# Patient Record
Sex: FEMALE | Race: WHITE | URBAN - METROPOLITAN AREA
[De-identification: names, ages, dates, MRNs, and addresses within clinical notes are randomized per-mention and may not be internally consistent; named-entity substitution may affect disease eponyms.]

---

## 2022-02-09 ENCOUNTER — OFFICE VISIT (OUTPATIENT)
Dept: URBAN - METROPOLITAN AREA CLINIC 72 | Facility: CLINIC | Age: 57
End: 2022-02-09
Payer: COMMERCIAL

## 2022-02-09 DIAGNOSIS — H18.233 SECONDARY CORNEAL EDEMA, BILATERAL: ICD-10-CM

## 2022-02-09 DIAGNOSIS — H40.053 OCULAR HYPERTENSION, BILATERAL: Primary | ICD-10-CM

## 2022-02-09 PROCEDURE — 92133 CPTRZD OPH DX IMG PST SGM ON: CPT

## 2022-02-09 PROCEDURE — 92004 COMPRE OPH EXAM NEW PT 1/>: CPT

## 2022-02-09 RX ORDER — DORZOLAMIDE HYDROCHLORIDE AND TIMOLOL MALEATE 20; 5 MG/ML; MG/ML
SOLUTION/ DROPS OPHTHALMIC
Qty: 1 | Refills: 4 | Status: INACTIVE
Start: 2022-02-09 | End: 2022-02-10

## 2022-02-09 RX ORDER — BRIMONIDINE TARTRATE 2 MG/ML
0.2 % SOLUTION/ DROPS OPHTHALMIC
Qty: 1 | Refills: 4 | Status: INACTIVE
Start: 2022-02-09 | End: 2022-02-10

## 2022-02-09 ASSESSMENT — INTRAOCULAR PRESSURE
OS: 80
OD: 72
OS: 72
OD: 80

## 2022-02-09 NOTE — IMPRESSION/PLAN
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 Plan: Discussed diagnosis in detail with patient. Edema should resolve with treating IOP Will continue to observe condition and or symptoms.

## 2022-02-09 NOTE — IMPRESSION/PLAN
Impression: Ocular hypertension, bilateral: H40.053. Patient reports ANGELA was 3 years ago and Dr mentioned she had high pressures but never treated. IOP 72 OU with [facility] Attempted to do [facility] Maxed out over 80 OU, sonya not aligned Plan: Discussed DX in detail with pt Pt voiced understanding Discussed treatment options. Recommend pt to start Acetazolamide 2 pills PO BID and start gtts Brimonidine BID OU D/T BID OU and Rhopressa QHS OU Rhopressa and Acetazolamide samples given in clinic today

## 2022-02-10 ENCOUNTER — OFFICE VISIT (OUTPATIENT)
Dept: URBAN - METROPOLITAN AREA CLINIC 71 | Facility: CLINIC | Age: 57
End: 2022-02-10
Payer: COMMERCIAL

## 2022-02-10 PROCEDURE — 99212 OFFICE O/P EST SF 10 MIN: CPT

## 2022-02-10 RX ORDER — BRIMONIDINE TARTRATE 2 MG/ML
0.2 % SOLUTION/ DROPS OPHTHALMIC
Qty: 5 | Refills: 4 | Status: ACTIVE
Start: 2022-02-10

## 2022-02-10 ASSESSMENT — INTRAOCULAR PRESSURE
OS: 15
OD: 29
OS: 17
OD: 28

## 2022-02-10 NOTE — IMPRESSION/PLAN
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 Neovascular glaucoma OU Gonioscopy performed today: No angle structures seen except inferior quadrant OD; No angle structures seen 360 OS
(+) rubeosis OU Current treatment:

500 mg Diamox BID PO through Saturday, taper to 250 mg BID PO starting Sunday 1 drop Rhopressa QHS OU
1 drop Dorzalmide TID OU
1 drop Brimonidine BID OU Pressures today of 29/17 mm Hg via [facility]
28/15 mm Hg via [facility] Pressured yesterday of 72/72 mm Hg via [facility]
80+/80+ via [facility]-maxed out on tonometer and mires were not aligned Offered iridotomy today, patient declined. Will proceed w/ pre-op on Monday and iridotomy to be performed Tuesday w/ Dr. Alex Payan. Plan: Patient and  educated on today's findings. Expressed multiple times throughout appointment that while IOP has improved today, long term, this treatment regimen may not be sustainable. Diamox is not a good long-term option for treating IOP, especially past the 3 month period as severe complications can develop. Explained to patient that iridotomy may not help bring IOP down, as angles may be irreversible at this point but attempting to create another route of fluid drainage should help with potential future complications. Educated patient and  on importance of seeking consult with glaucoma specialist, Dr. Tarah Breen, as patient will most likely need shunt/tube procedure to keep IOP at desired and stable level. Pending appt at this time and will be scheduled w/ Dr. Tarah Breen ASAP in [place]. Expressed multiple times to patient and  that glaucomatous damage, is non-reversible. Vision lost will not be regained. If OS does not get proper surgical treatment, can go blind as OD did. Patient has much anxiety about any eye procedures and requests 1 mg Lorazepam before any procedure. To follow up w/ PCP, RM Galarza and request dosage for procedure on Tuesday.

## 2022-02-14 ENCOUNTER — OFFICE VISIT (OUTPATIENT)
Dept: URBAN - METROPOLITAN AREA CLINIC 71 | Facility: CLINIC | Age: 57
End: 2022-02-14
Payer: COMMERCIAL

## 2022-02-14 DIAGNOSIS — H25.813 COMBINED FORMS OF AGE-RELATED CATARACT, BILATERAL: ICD-10-CM

## 2022-02-14 DIAGNOSIS — H40.89 OTHER SPECIFIED GLAUCOMA: Primary | ICD-10-CM

## 2022-02-14 PROCEDURE — 92012 INTRM OPH EXAM EST PATIENT: CPT | Performed by: OPHTHALMOLOGY

## 2022-02-14 ASSESSMENT — INTRAOCULAR PRESSURE
OD: 55
OD: 60
OS: 16
OS: 15

## 2022-02-14 NOTE — IMPRESSION/PLAN
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
(+) rubeosis OU Discussed with patient that for OD the goal is to keep the eye comfortable. Plan: Continue use of Diamox  mg PO, Rhopressa QHS OU, Dorzalmide TID OU and Brimonidine BID OU. Patient to return as scheduled for the LPI tomorrow for OS with Dr. Caass Peers.

## 2022-02-15 ENCOUNTER — SURGERY (OUTPATIENT)
Dept: URBAN - METROPOLITAN AREA SURGERY 44 | Facility: SURGERY | Age: 57
End: 2022-02-15
Payer: COMMERCIAL

## 2022-02-15 ENCOUNTER — SURGERY (OUTPATIENT)
Dept: URBAN - METROPOLITAN AREA SURGERY 45 | Facility: SURGERY | Age: 57
End: 2022-02-15
Payer: COMMERCIAL

## 2022-02-15 PROCEDURE — 66761 REVISION OF IRIS: CPT | Performed by: OPHTHALMOLOGY

## 2022-02-19 ENCOUNTER — OFFICE VISIT (OUTPATIENT)
Dept: URBAN - METROPOLITAN AREA CLINIC 71 | Facility: CLINIC | Age: 57
End: 2022-02-19
Payer: COMMERCIAL

## 2022-02-19 PROCEDURE — 99212 OFFICE O/P EST SF 10 MIN: CPT

## 2022-02-19 ASSESSMENT — INTRAOCULAR PRESSURE
OD: 48
OS: 11

## 2022-02-19 ASSESSMENT — KERATOMETRY
OD: 47.00
OS: 47.75

## 2022-02-19 NOTE — IMPRESSION/PLAN
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
(+) rubeosis OU
OU: Discussed diagnosis with the PT. IOP greatly improved S/P LPI OS at 11 today. PT is anxious on getting down to the [place] for Dr. Shirlene Pallas because of her bad back. Advised PT she really should see a glaucoma specialist and would like to get her in by the 1 month juan jose, as patient is well managed now but forewarned, again, about long term side effects of Diamox use. Could possibly get her scheduled with Dr. Efrain Coppola will look into Dr. Efrain Coppola taking new patients and if he is still performing glaucoma surgeries and let her know. For now PT is to continue taking Brimonidine BID OU, Dorzolamide TID OU and PRED TID OS, along w/ 250 mg Diamox BID PO. Recommend at next scheduled visit we get a VF 24-2 OS ONLY

## 2022-02-19 NOTE — IMPRESSION/PLAN
Impression: Combined forms of age-related cataract, bilateral: H25.813. Plan: Cataract are stable OU. Discussed due to monocular vision OS we will hold off on CAT SX as long as we can. No treatment currently recommended. The patient will monitor vision changes and contact us with any decrease in vision.

## 2022-03-09 ENCOUNTER — OFFICE VISIT (OUTPATIENT)
Dept: URBAN - METROPOLITAN AREA CLINIC 72 | Facility: CLINIC | Age: 57
End: 2022-03-09
Payer: COMMERCIAL

## 2022-03-09 DIAGNOSIS — H40.1133 PRIMARY OPEN-ANGLE GLAUCOMA, BILATERAL, SEVERE STAGE: Primary | ICD-10-CM

## 2022-03-09 PROCEDURE — 92012 INTRM OPH EXAM EST PATIENT: CPT

## 2022-03-09 PROCEDURE — 92083 EXTENDED VISUAL FIELD XM: CPT

## 2022-03-09 ASSESSMENT — INTRAOCULAR PRESSURE
OS: 18
OD: 55

## 2022-04-01 ENCOUNTER — TESTING ONLY (OUTPATIENT)
Dept: URBAN - METROPOLITAN AREA CLINIC 71 | Facility: CLINIC | Age: 57
End: 2022-04-01

## 2022-04-01 DIAGNOSIS — H52.4 PRESBYOPIA: Primary | ICD-10-CM

## 2022-04-01 ASSESSMENT — INTRAOCULAR PRESSURE
OD: 57
OS: 18

## 2022-04-01 ASSESSMENT — VISUAL ACUITY
OD: NLP
OS: 20/20

## 2022-04-01 NOTE — IMPRESSION/PLAN
Impression: Presbyopia: H52.4. BCVA OS only 20/20 OD NLP Plan: Dispensed new glasses Rx today and discussed changes and possible adaptation period. Patient to call if any issues with new glasses occur.

## 2022-05-02 ENCOUNTER — OFFICE VISIT (OUTPATIENT)
Dept: URBAN - METROPOLITAN AREA CLINIC 24 | Facility: CLINIC | Age: 57
End: 2022-05-02
Payer: COMMERCIAL

## 2022-05-02 DIAGNOSIS — H40.50X2 GLAUCOMA SECONDARY TO OTHER EYE DISORDER, MODERATE STAGE: Primary | ICD-10-CM

## 2022-05-02 DIAGNOSIS — H34.8311 TRIB RTNL VEIN OCCLUSION, RIGHT EYE, W RTNL NEOVAS: ICD-10-CM

## 2022-05-02 DIAGNOSIS — H25.13 AGE-RELATED NUCLEAR CATARACT, BILATERAL: ICD-10-CM

## 2022-05-02 DIAGNOSIS — H40.1133 PRIMARY OPEN-ANGLE GLAUCOMA, BILATERAL, SEVERE STAGE: ICD-10-CM

## 2022-05-02 PROCEDURE — 92133 CPTRZD OPH DX IMG PST SGM ON: CPT | Performed by: OPHTHALMOLOGY

## 2022-05-02 PROCEDURE — 92004 COMPRE OPH EXAM NEW PT 1/>: CPT | Performed by: OPHTHALMOLOGY

## 2022-05-02 PROCEDURE — 76514 ECHO EXAM OF EYE THICKNESS: CPT | Performed by: OPHTHALMOLOGY

## 2022-05-02 RX ORDER — PREDNISOLONE ACETATE 10 MG/ML
1 % SUSPENSION/ DROPS OPHTHALMIC
Qty: 5 | Refills: 1 | Status: ACTIVE
Start: 2022-05-02

## 2022-05-02 RX ORDER — OFLOXACIN 3 MG/ML
0.3 % SOLUTION/ DROPS OPHTHALMIC
Qty: 5 | Refills: 1 | Status: ACTIVE
Start: 2022-05-02

## 2022-05-02 ASSESSMENT — INTRAOCULAR PRESSURE
OS: 19
OD: 65

## 2022-05-02 NOTE — IMPRESSION/PLAN
Impression: Age-related nuclear cataract, bilateral: H25.13.  Plan: May need Cat MIGS OS if IOP elevates once diamox is removed

## 2022-05-02 NOTE — IMPRESSION/PLAN
Impression: Trib rtnl vein occlusion, right eye, w rtnl neovas: V34.2995. Plan: + vein occlusion OD on exam - NLP confirmed with Dr. Phi Frank Comfort care
+ HTN - discussed origin of vein occlusion and that vision loss is permanent

## 2022-05-19 ENCOUNTER — SURGERY (OUTPATIENT)
Dept: URBAN - METROPOLITAN AREA SURGERY 12 | Facility: SURGERY | Age: 57
End: 2022-05-19
Payer: COMMERCIAL

## 2022-05-19 PROCEDURE — 66710 CILIARY TRANSSLERAL THERAPY: CPT | Performed by: OPHTHALMOLOGY

## 2022-05-20 ENCOUNTER — POST-OPERATIVE VISIT (OUTPATIENT)
Dept: URBAN - METROPOLITAN AREA CLINIC 72 | Facility: CLINIC | Age: 57
End: 2022-05-20
Payer: COMMERCIAL

## 2022-05-20 DIAGNOSIS — Z48.810 ENCOUNTER FOR SURGICAL AFTERCARE FOLLOWING SURGERY ON A SENSE ORGAN: Primary | ICD-10-CM

## 2022-05-20 PROCEDURE — 99024 POSTOP FOLLOW-UP VISIT: CPT | Performed by: OPTOMETRIST

## 2022-05-20 ASSESSMENT — INTRAOCULAR PRESSURE
OS: 17
OD: 15

## 2022-05-20 NOTE — IMPRESSION/PLAN
Impression: S/P Diode G-Probe Laser OD - 1 Day. Encounter for surgical aftercare following surgery on a sense organ  Z48.810. Excellent post op course   Post operative instructions reviewed - Condition is improving - Plan: IOP has decreased OD, recommend pt to continue with Pred and ofloxacin QID OD and continue with Brimonidine BID OS Dorzolamide BID OS and Rhopressa QHS OS will reevaluate in 1 wk --Advised patient to use artificial tears for comfort.

## 2022-05-26 ENCOUNTER — POST-OPERATIVE VISIT (OUTPATIENT)
Dept: URBAN - METROPOLITAN AREA CLINIC 72 | Facility: CLINIC | Age: 57
End: 2022-05-26
Payer: COMMERCIAL

## 2022-05-26 PROCEDURE — 99024 POSTOP FOLLOW-UP VISIT: CPT | Performed by: OPTOMETRIST

## 2022-05-26 ASSESSMENT — INTRAOCULAR PRESSURE
OD: 19
OS: 19

## 2022-05-26 NOTE — IMPRESSION/PLAN
Impression: S/P Diode G-Probe Laser OD - 7 Days. Encounter for surgical aftercare following surgery on a sense organ  Z48.810. Excellent post op course   Post operative instructions reviewed - Condition is improving - Plan: Pt to continue with gtts in OS Brimonidine bid OS, Dorzolamide tid OS, Rhopressa qhs OS
recommend pt to keep scheduled appt with Dr Bee Mitchell 
 --Advised patient to use artificial tears for comfort. --Taper Prednisolone acetate 1% TID x 1 wk, BID x 1wk, QD x 1wk, then d/c--Discontinue Ofloxacin 0.3%

## 2022-06-15 ENCOUNTER — POST-OPERATIVE VISIT (OUTPATIENT)
Dept: URBAN - METROPOLITAN AREA CLINIC 24 | Facility: CLINIC | Age: 57
End: 2022-06-15
Payer: COMMERCIAL

## 2022-06-15 DIAGNOSIS — H40.50X2 GLAUCOMA SECONDARY TO OTHER EYE DISORDER, MODERATE STAGE: Primary | ICD-10-CM

## 2022-06-15 DIAGNOSIS — H25.13 AGE-RELATED NUCLEAR CATARACT, BILATERAL: ICD-10-CM

## 2022-06-15 PROCEDURE — 99024 POSTOP FOLLOW-UP VISIT: CPT | Performed by: OPHTHALMOLOGY

## 2022-06-15 ASSESSMENT — INTRAOCULAR PRESSURE
OD: 9
OS: 18

## 2022-06-15 NOTE — IMPRESSION/PLAN
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
-s/p LPI OS 02/15/22 by Dr. Landon James MD; + NVI OD 
-OD comfort care Plan: Pt has Glaucoma    Gonio :  RENATO OD // narrow bare SS to  - possible synechiae (challenging exam with squeezing)      Pachs: 596/571     Today's IOP :9/18     Tmax  :  80/80 Target IOP comfort care OD // under 21 OS Pt denies Fhx of Glaucoma Left eye is the ONLY seeing eye HVF: 03/09/22 baseline C/D:  0.7 OD // 0.4 OS 
OCT: OD NLP unable OS 60 (05/02/22) Pt denies Sulfa Allergy // Pt denies Lung /Heart dx Plan :
1. Continue Simbrinza BID OS (Change from Dorzolamide TID OS Brimonidine BID OS 2/2  drop burden) Pred increase to QID form QD OD ONLY
((Hold Rhopressa QHS OS can add back as needed) 2. IOP much improved post diode. Patient still has inflammation will adjust pred as above.  Patient to return for IOP closer to home 3 weeks IOP & AC Check)

## 2022-07-06 ENCOUNTER — POST-OPERATIVE VISIT (OUTPATIENT)
Dept: URBAN - METROPOLITAN AREA CLINIC 72 | Facility: CLINIC | Age: 57
End: 2022-07-06
Payer: COMMERCIAL

## 2022-07-06 DIAGNOSIS — Z48.810 ENCOUNTER FOR SURGICAL AFTERCARE FOLLOWING SURGERY ON A SENSE ORGAN: Primary | ICD-10-CM

## 2022-07-06 PROCEDURE — 99024 POSTOP FOLLOW-UP VISIT: CPT

## 2022-07-06 ASSESSMENT — INTRAOCULAR PRESSURE
OS: 18
OD: 9

## 2022-07-06 NOTE — IMPRESSION/PLAN
Impression: S/P Diode G-Probe Laser OD - 48 Days. Encounter for surgical aftercare following surgery on a sense organ  Z48.810. Post operative instructions reviewed - Condition is improving - Plan: Pt has had only samples of Mary Carmen Bonner, do not have any samples and pt does not think insurance will cover. Will have pt D/c Simbrinza and restart Brimonidine BID OS, pt voices understanding (pt has some Brimonidine left over) Pt to continue with Pred-acetate 1 gtt QID OD Discussed seeing Dr Lexi Pineda for vision therapy due to pt having only one good seeing eye. Pt voices understanding
  --Advised patient to use artificial tears for comfort.

## 2022-07-13 ENCOUNTER — POST-OPERATIVE VISIT (OUTPATIENT)
Dept: URBAN - METROPOLITAN AREA CLINIC 72 | Facility: CLINIC | Age: 57
End: 2022-07-13
Payer: COMMERCIAL

## 2022-07-13 DIAGNOSIS — Z48.810 ENCOUNTER FOR SURGICAL AFTERCARE FOLLOWING SURGERY ON A SENSE ORGAN: Primary | ICD-10-CM

## 2022-07-13 PROCEDURE — 99024 POSTOP FOLLOW-UP VISIT: CPT

## 2022-07-13 ASSESSMENT — INTRAOCULAR PRESSURE
OD: 18
OD: 19
OS: 28
OS: 24

## 2022-07-13 NOTE — IMPRESSION/PLAN
Impression: S/P Diode G-Probe Laser OD - 55 Days. Encounter for surgical aftercare following surgery on a sense organ  Z48.810. Post operative instructions reviewed - Plan: IOP is elevated in OS. IOP target is under 21 OS> Recommend pt to continue with Brimonidine BID OS and restart Rhopressa 1 gtt QHS OS Pt can decrease Pred-acetate BID x 2 wks then QD x 2 wks then will reevaluate at this time. Discussed possibility of seeing Dr Celeste Martinez for [facility] therapy. Pt is complains of having a hard time navigating due to NLP in OD and severe peripheral restriction OS> Will send notes to [facility] office and they will determine if pt is candidate. Pt understands --Advised patient to use artificial tears for comfort.

## 2022-08-10 ENCOUNTER — POST-OPERATIVE VISIT (OUTPATIENT)
Dept: URBAN - METROPOLITAN AREA CLINIC 72 | Facility: CLINIC | Age: 57
End: 2022-08-10
Payer: COMMERCIAL

## 2022-08-10 DIAGNOSIS — Z48.810 ENCOUNTER FOR SURGICAL AFTERCARE FOLLOWING SURGERY ON A SENSE ORGAN: Primary | ICD-10-CM

## 2022-08-10 PROCEDURE — 99024 POSTOP FOLLOW-UP VISIT: CPT

## 2022-08-10 ASSESSMENT — INTRAOCULAR PRESSURE
OS: 22
OD: 37

## 2022-08-10 NOTE — IMPRESSION/PLAN
Impression: S/P Diode G-Probe Laser OD - 83 Days. Encounter for surgical aftercare following surgery on a sense organ  Z48.810. Post operative instructions reviewed - Plan: Discussed today's findings in detail with pt Recommend pt to Brimonidine BID Rhopressa QHS OS and recommend pt to increase Pred-acetate to TID OD due to appearance of synechia closing on PI, which may have caused the increase in IOP. Educated pt on OIS, Ocular Ischemic syndrome. Explained  to pt that there are MA's and blot hemes in mid periphery which may have been caused by the carotid artery being stenosed, on the right side. Discussed risk of stroke and recommend full cardio work up including carotid artery ultra sound with focus on the right side. Pt to monitor BP and general health for any changes. Pt to RTC if any worsening in or sudden VA changes PT understands. Will have pt RTC in 1 wk for IOP and optos --Advised patient to use artificial tears for comfort.

## 2022-08-17 ENCOUNTER — POST-OPERATIVE VISIT (OUTPATIENT)
Dept: URBAN - METROPOLITAN AREA CLINIC 72 | Facility: CLINIC | Age: 57
End: 2022-08-17
Payer: COMMERCIAL

## 2022-08-17 DIAGNOSIS — Z48.810 ENCOUNTER FOR SURGICAL AFTERCARE FOLLOWING SURGERY ON A SENSE ORGAN: Primary | ICD-10-CM

## 2022-08-17 PROCEDURE — 99024 POSTOP FOLLOW-UP VISIT: CPT

## 2022-08-17 ASSESSMENT — INTRAOCULAR PRESSURE
OD: 64
OD: 60
OS: 32
OS: 29

## 2022-08-17 NOTE — IMPRESSION/PLAN
Impression: S/P Diode G-Probe Laser OD - 90 Days. Encounter for surgical aftercare following surgery on a sense organ  Z48.810. Post operative instructions reviewed - Plan: Pt IOP has increased significantly since last visit. Explained that pt is most likely responding to steroid, steroid was started in attempt to help the vessels that were closing on PI, but cornea is hazy today as well. Recommend pt to D/c Pred-acetate to see if IOP decreases and haze starts to subside. Pt to continue with glaucoma gtts OS> Rhopressa QHS OS and Brimonidine BID OS. Will reevaluate on Friday 
 --Advised patient to use artificial tears for comfort.

## 2022-08-19 ENCOUNTER — OFFICE VISIT (OUTPATIENT)
Dept: URBAN - METROPOLITAN AREA CLINIC 71 | Facility: CLINIC | Age: 57
End: 2022-08-19
Payer: COMMERCIAL

## 2022-08-19 DIAGNOSIS — H40.50X2 GLAUCOMA SECONDARY TO OTHER EYE DISORDER, MODERATE STAGE: Primary | ICD-10-CM

## 2022-08-19 PROCEDURE — 99212 OFFICE O/P EST SF 10 MIN: CPT

## 2022-08-19 ASSESSMENT — INTRAOCULAR PRESSURE
OS: 17
OD: 13

## 2022-08-19 NOTE — IMPRESSION/PLAN
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
-s/p LPI OS 02/15/22 by Dr. Keara Potts MD; + NVI OD
- S/P Diode G-Probe Laser OD By Dr. Opal Rodríguez: IOP is stable today. Optos shows no change in hemorrhagic status OD. Patient to have Cardiologist consult with Dr. Kaela Silva next week. Focus on right carotid. Pt to continue with glaucoma gtts OS> Rhopressa QHS OS and Brimonidine BID OS. Will reevaluate Thursday next week between 2-3pm for IOP check. --Advised patient to use artificial tears for comfort.

## 2022-08-25 ENCOUNTER — OFFICE VISIT (OUTPATIENT)
Dept: URBAN - METROPOLITAN AREA CLINIC 71 | Facility: CLINIC | Age: 57
End: 2022-08-25
Payer: COMMERCIAL

## 2022-08-25 DIAGNOSIS — H40.89 OTHER SPECIFIED GLAUCOMA: Primary | ICD-10-CM

## 2022-08-25 PROCEDURE — 99213 OFFICE O/P EST LOW 20 MIN: CPT

## 2022-08-25 RX ORDER — DORZOLAMIDE HCL 20 MG/ML
2 % SOLUTION/ DROPS OPHTHALMIC
Qty: 5 | Refills: 2 | Status: ACTIVE
Start: 2022-08-25

## 2022-08-25 RX ORDER — BRINZOLAMIDE 10 MG/ML
1 % SUSPENSION/ DROPS OPHTHALMIC
Qty: 5 | Refills: 0 | Status: INACTIVE
Start: 2022-08-25 | End: 2022-08-25

## 2022-08-25 ASSESSMENT — INTRAOCULAR PRESSURE
OS: 29
OS: 22
OD: 50
OD: 57

## 2022-08-25 NOTE — IMPRESSION/PLAN
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 Neovascular glaucoma OU. - Discussed with patient that for OD the goal is to keep the eye comfortable. Plan: IOP is elevated today in OS. Pt to continue with glaucoma gtts, Rhopressa QD OS, Brimonidine BID OS and will have patient start Dorzolamide BID OS. Will reevaluate in 2 weeks.

## 2022-09-07 ENCOUNTER — OFFICE VISIT (OUTPATIENT)
Dept: URBAN - METROPOLITAN AREA CLINIC 72 | Facility: CLINIC | Age: 57
End: 2022-09-07
Payer: COMMERCIAL

## 2022-09-07 DIAGNOSIS — H40.50X2 GLAUCOMA SECONDARY TO OTHER EYE DISORDER, MODERATE STAGE: Primary | ICD-10-CM

## 2022-09-07 PROCEDURE — 92012 INTRM OPH EXAM EST PATIENT: CPT

## 2022-09-07 ASSESSMENT — INTRAOCULAR PRESSURE
OD: 36
OS: 25

## 2022-09-07 NOTE — IMPRESSION/PLAN
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
-s/p LPI OS 02/15/22 by Dr. Kim Villalobos MD; + NVI OD
- S/P Diode G-Probe Laser OD By Dr. Delia Blair

IOP 36 25 TMAX 80+ Plan: IOP is still elevated today, but pt had D/c Rhopressa. Recommend pt to restart Rhopressa 1 gtt QHS OS and continue with Brimonidine BID OS and Dorzolamide BID OS> Pt voices understanding. Will reevaluate in 1 wk *Refill RX next week

## 2022-09-14 ENCOUNTER — OFFICE VISIT (OUTPATIENT)
Dept: URBAN - METROPOLITAN AREA CLINIC 72 | Facility: CLINIC | Age: 57
End: 2022-09-14
Payer: COMMERCIAL

## 2022-09-14 DIAGNOSIS — H40.50X2 GLAUCOMA SECONDARY TO OTHER EYE DISORDER, MODERATE STAGE: Primary | ICD-10-CM

## 2022-09-14 PROCEDURE — 92012 INTRM OPH EXAM EST PATIENT: CPT

## 2022-09-14 ASSESSMENT — INTRAOCULAR PRESSURE
OD: 28
OS: 20

## 2022-09-14 NOTE — IMPRESSION/PLAN
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
-s/p LPI OS 02/15/22 by Dr. Huber Bush MD; + NVI OD
- S/P Diode G-Probe Laser OD By Dr. Patience Pruitt

IOP 28 20 TMAX 80+ Plan: IOP is still slightly elevated OS> but is doing better Discussed seeing a glaucoma specialist for second opinion. (Pt would prefer to see someone in [place] then having to go to [facility]. Recommend pt to see Dr Rebecca Bence in [place]) VS trying SLT to lower IOP  with Dr Huber Bush. Will have pt RTC in 1 month to recheck IOP and will re-discuss have pt get SLT done. Pt voices understanding.

## 2022-10-12 ENCOUNTER — OFFICE VISIT (OUTPATIENT)
Dept: URBAN - METROPOLITAN AREA CLINIC 72 | Facility: CLINIC | Age: 57
End: 2022-10-12
Payer: COMMERCIAL

## 2022-10-12 DIAGNOSIS — H40.50X2 GLAUCOMA SECONDARY TO OTHER EYE DISORDER, MODERATE STAGE: Primary | ICD-10-CM

## 2022-10-12 PROCEDURE — 92012 INTRM OPH EXAM EST PATIENT: CPT

## 2022-10-12 RX ORDER — BRIMONIDINE TARTRATE 2 MG/ML
0.2 % SOLUTION/ DROPS OPHTHALMIC
Qty: 3 | Refills: 4 | Status: ACTIVE
Start: 2022-10-12

## 2022-10-12 ASSESSMENT — INTRAOCULAR PRESSURE
OD: 35
OS: 18

## 2022-10-12 NOTE — IMPRESSION/PLAN
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
-s/p LPI OS 02/15/22 by Dr. Eusebia Hagen MD; + NVI OD
- S/P Diode G-Probe Laser OD By Dr. Leelee Rankin

IOP 35 18 TMAX 80+ Plan: IOP is doing better under 20 today. Due to pt doing well currently and IOP is not still increasing will continue to monitor at this time. Recommend close monitoring due to fluctuation in IOP. If IOP continues to wax and wane will send to Dr Fernanda Hawk or Dr Eusebia Hagen for consult or SLT. recommend pt to continue with current regimen Brimonidine BID OS Dorzolamide BID OS and Rhopressa QHS OS Pt voices understanding.

## 2023-01-11 ENCOUNTER — OFFICE VISIT (OUTPATIENT)
Dept: URBAN - METROPOLITAN AREA CLINIC 72 | Facility: CLINIC | Age: 58
End: 2023-01-11
Payer: COMMERCIAL

## 2023-01-11 DIAGNOSIS — H40.52X2 GLAUCOMA OF LEFT EYE SECONDARY TO OTHER EYE DISORDER, MODERATE STAGE: Primary | ICD-10-CM

## 2023-01-11 PROCEDURE — 92133 CPTRZD OPH DX IMG PST SGM ON: CPT

## 2023-01-11 PROCEDURE — 92012 INTRM OPH EXAM EST PATIENT: CPT

## 2023-01-11 RX ORDER — DORZOLAMIDE HCL 20 MG/ML
2 % SOLUTION/ DROPS OPHTHALMIC
Qty: 5 | Refills: 2 | Status: ACTIVE
Start: 2023-01-11

## 2023-01-11 RX ORDER — NETARSUDIL 0.2 MG/ML
0.02 % SOLUTION/ DROPS OPHTHALMIC; TOPICAL
Qty: 0 | Refills: 0 | Status: ACTIVE
Start: 2023-01-11

## 2023-01-11 RX ORDER — BRIMONIDINE TARTRATE 2 MG/ML
0.2 % SOLUTION/ DROPS OPHTHALMIC
Qty: 5 | Refills: 3 | Status: ACTIVE
Start: 2023-01-11

## 2023-01-11 ASSESSMENT — INTRAOCULAR PRESSURE
OS: 17
OD: 27

## 2023-01-11 NOTE — IMPRESSION/PLAN
Impression: Glaucoma of left eye secondary to other eye disorder, moderate stage: H40.52x2. IOP 27 17 OCT ordered and done today 50 superior and inferior thinning OS Tmax 80 OU
CCT thick OU Plan: IOP is doing well at this time. Pt has been stable since last visit.

## 2023-02-08 ENCOUNTER — OFFICE VISIT (OUTPATIENT)
Dept: URBAN - METROPOLITAN AREA CLINIC 72 | Facility: CLINIC | Age: 58
End: 2023-02-08
Payer: COMMERCIAL

## 2023-02-08 DIAGNOSIS — H40.1133 PRIMARY OPEN-ANGLE GLAUCOMA, BILATERAL, SEVERE STAGE: Primary | ICD-10-CM

## 2023-02-08 PROCEDURE — 92083 EXTENDED VISUAL FIELD XM: CPT

## 2023-02-08 PROCEDURE — 92012 INTRM OPH EXAM EST PATIENT: CPT

## 2023-02-08 ASSESSMENT — INTRAOCULAR PRESSURE
OD: 15
OS: 18

## 2023-02-10 NOTE — IMPRESSION/PLAN
Impression: Primary open-angle glaucoma, bilateral, severe stage: X83.6783. VF 24-2 ordered and done today 
severe restriction 360 OS 
slightly worse since '22 Plan: Discussed DX and today's VF with pt Explained that pt is showing slight worsening since last year. IOP fluctuated through out the year, but IOP has been more consistent for the last few visits. Informed pt glaucoma surgery would be only other option if IOP does not stay stable/ more changes are seen. Will have pt RTC in 2 months to recheck IOP and testing. If any worsening will send pt to specialist, until then pt to continue with current regimen. Brimonidine BID OU, Dorzolamide BID OU and Rhopressa QHS OU.

## 2023-04-05 ENCOUNTER — OFFICE VISIT (OUTPATIENT)
Dept: URBAN - METROPOLITAN AREA CLINIC 72 | Facility: CLINIC | Age: 58
End: 2023-04-05
Payer: COMMERCIAL

## 2023-04-05 DIAGNOSIS — H40.1133 PRIMARY OPEN-ANGLE GLAUCOMA, BILATERAL, SEVERE STAGE: Primary | ICD-10-CM

## 2023-04-05 PROCEDURE — 92083 EXTENDED VISUAL FIELD XM: CPT

## 2023-04-05 PROCEDURE — 92012 INTRM OPH EXAM EST PATIENT: CPT

## 2023-04-05 ASSESSMENT — INTRAOCULAR PRESSURE
OD: 25
OS: 22

## 2023-04-05 NOTE — IMPRESSION/PLAN
Impression: Primary open-angle glaucoma, bilateral, severe stage: D83.8873. VF 24-2 ordered and done today 
severe restriction 360 OS 
slightly worse since '22 Plan: Discussed DX and today's VF with pt Explained that pt is showing slight worsening since last year. IOP fluctuated through out the year, but IOP has been more consistent for the last few visits, until today. Informed pt glaucoma surgery is only other option as IOP has not stayed stable/ more changes have been seen. will send pt to specialist, until then pt to continue with current regimen. Brimonidine BID OU, Dorzolamide BID OU and Rhopressa QHS OU.

## 2023-04-25 ENCOUNTER — OFFICE VISIT (OUTPATIENT)
Facility: LOCATION | Age: 58
End: 2023-04-25
Payer: COMMERCIAL

## 2023-04-25 DIAGNOSIS — H40.2213 CHRONIC ANGLE-CLOSURE GLAUCOMA, RIGHT EYE, SEVERE STAGE: ICD-10-CM

## 2023-04-25 DIAGNOSIS — H40.52X2 GLAUCOMA SECONDARY TO OTHER EYE DISORDERS, LEFT EYE, MODERATE STAGE: ICD-10-CM

## 2023-04-25 DIAGNOSIS — H40.1122 PRIMARY OPEN-ANGLE GLAUCOMA, MODERATE STAGE, LEFT EYE: ICD-10-CM

## 2023-04-25 DIAGNOSIS — H40.1133 PRIMARY OPEN-ANGLE GLAUCOMA, BILATERAL, SEVERE STAGE: Primary | ICD-10-CM

## 2023-04-25 DIAGNOSIS — H40.1112 PRIMARY OPEN-ANGLE GLAUCOMA, MODERATE STAGE, RIGHT EYE: ICD-10-CM

## 2023-04-25 DIAGNOSIS — H25.813 COMBINED FORMS OF AGE-RELATED CATARACT, BILATERAL: ICD-10-CM

## 2023-04-25 PROCEDURE — 76514 ECHO EXAM OF EYE THICKNESS: CPT | Performed by: OPHTHALMOLOGY

## 2023-04-25 PROCEDURE — 92133 CPTRZD OPH DX IMG PST SGM ON: CPT | Performed by: OPHTHALMOLOGY

## 2023-04-25 PROCEDURE — 92020 GONIOSCOPY: CPT | Performed by: OPHTHALMOLOGY

## 2023-04-25 PROCEDURE — 99214 OFFICE O/P EST MOD 30 MIN: CPT | Performed by: OPHTHALMOLOGY

## 2023-04-25 ASSESSMENT — INTRAOCULAR PRESSURE
OD: 28
OS: 23

## 2023-04-25 NOTE — IMPRESSION/PLAN
Impression: Primary open-angle glaucoma, moderate stage, left eye: H40.1122.
(mixed- s/p LPI) Plan: Discussed DX. Ordered and reviewed PACHS/OCT RNFL OU. Explained that IOP is too high so discussed options such as SLT vs cat sx with Aletha Webb. Monocular precautions discussed. Pt wishes to think about options and call back to schedule whichever she decides, [facility]. Continue with current regimen- Brimonidine BID OU, Dorzolamide BID OU and Rhopressa QHS OU.

## 2023-04-25 NOTE — IMPRESSION/PLAN
Impression: Combined forms of age-related cataract, bilateral: H25.813.  Plan: when patient desires cat sx with omni, durysta to help control IOP

## 2023-05-04 ENCOUNTER — OFFICE VISIT (OUTPATIENT)
Dept: URBAN - METROPOLITAN AREA CLINIC 71 | Facility: CLINIC | Age: 58
End: 2023-05-04
Payer: COMMERCIAL

## 2023-05-04 DIAGNOSIS — H40.2213 CHRONIC ANGLE-CLOSURE GLAUCOMA, RIGHT EYE, SEVERE STAGE: ICD-10-CM

## 2023-05-04 DIAGNOSIS — H40.1122 PRIMARY OPEN-ANGLE GLAUCOMA, MODERATE STAGE, LEFT EYE: Primary | ICD-10-CM

## 2023-05-04 DIAGNOSIS — H25.813 COMBINED FORMS OF AGE-RELATED CATARACT, BILATERAL: ICD-10-CM

## 2023-05-04 PROCEDURE — 99212 OFFICE O/P EST SF 10 MIN: CPT

## 2023-05-04 ASSESSMENT — INTRAOCULAR PRESSURE
OD: 48
OS: 19

## 2023-05-04 NOTE — IMPRESSION/PLAN
Impression: Combined forms of age-related cataract, bilateral: H25.813. Plan: F/U With Dr. Wilfredo Jones for Cataract surgery w/ MIGS as recommended.  Will get her scheduled back with Dr. Heidi Tellez.

## 2023-05-04 NOTE — IMPRESSION/PLAN
Impression: Primary open-angle glaucoma, moderate stage, left eye: H40.1122.
(mixed- s/p LPI) Plan: Discussed Dr. Pat Williamson findings and his recommendation for surgery. At this stage with the synechiae almost appearing 360 in the right eye and the IOP being to high, the recommendation for CAT SX with MIGS would be the best option. She is going to need cataract surgery at some point anyways. Recommend she F/U with Dr. Mary Krueger to get the process started and at that time ask him about a sedative she could use prior to surgery since she is extremely anxious about surgery. Continue the Brimonidine BID OU, Dorzolamide BID OS and Rhopressa QHS OU.

## 2023-05-23 ENCOUNTER — OFFICE VISIT (OUTPATIENT)
Facility: LOCATION | Age: 58
End: 2023-05-23
Payer: COMMERCIAL

## 2023-05-23 DIAGNOSIS — H40.2213 CHRONIC ANGLE-CLOSURE GLAUCOMA, RIGHT EYE, SEVERE STAGE: ICD-10-CM

## 2023-05-23 DIAGNOSIS — H25.813 COMBINED FORMS OF AGE-RELATED CATARACT, BILATERAL: Primary | ICD-10-CM

## 2023-05-23 DIAGNOSIS — H40.1122 PRIMARY OPEN-ANGLE GLAUCOMA, MODERATE STAGE, LEFT EYE: ICD-10-CM

## 2023-05-23 PROCEDURE — 92014 COMPRE OPH EXAM EST PT 1/>: CPT | Performed by: OPHTHALMOLOGY

## 2023-05-23 RX ORDER — PREDNISOLONE ACETATE 10 MG/ML
1 % SUSPENSION/ DROPS OPHTHALMIC
Qty: 10 | Refills: 1 | Status: ACTIVE
Start: 2023-05-23

## 2023-05-23 ASSESSMENT — INTRAOCULAR PRESSURE
OS: 20
OD: 46

## 2023-05-23 NOTE — IMPRESSION/PLAN
Impression: Chronic angle-closure glaucoma, right eye, severe stage: R95.0763. Plan: Goal is comfort care. If patient starts to experience pain consider Micropulse.
Impression: Combined forms of age-related cataract, bilateral: H25.813. Plan: OK for CE/Phaco/IOL/OMNI/Durysta OS  in [place], RL2. Add Omidria. Add Pred post op. Distance target. Discussed lens options, Toric/Trifocal/ORA. Discussed intralacrimal Dextenza and/or intracameral Dexycu/Moxifloxacin. Pt is a candidate for multifocal lens. Discussed need for glasses for near vision with standard IOL and possibly Trifocal as well. Discussed diagnosis of cataracts. Cataracts are limiting vision. Discussed risks, benefits and alternatives to surgery including but not limited to: bleeding, infection, risk of vision loss, loss of the eye, need for other surgery. Patient voiced understanding and wishes to proceed. 
Schedule Linette PAYNE, H&PE/Phaco/IOL/OMNI/Durysta OS
Impression: Primary open-angle glaucoma, moderate stage, left eye: H40.1122.
(mixed- s/p LPI) Plan: Discussed glaucoma and risk of vision loss without treatment and close follow-up. Discussed risks/benefits/alternatives to treatment. IOP too high for optic nerve appearance. OK for  OMNI/Durysta in [place], RL2.  IOP is not controlled on current regimen. Discussed risks of glaucoma surgery including but not limited to: bleeding, infection, risk of vision loss, loss of the eye, need for other surgeries (cornea/glaucoma/retina), and need to resume glaucoma eye drops. Patient voiced understanding and wishes to proceed.
Detail Level: Detailed
Quality 110: Preventive Care And Screening: Influenza Immunization: Influenza Immunization not Administered for Documented Reasons.

## 2023-06-29 ENCOUNTER — PRE-OPERATIVE VISIT (OUTPATIENT)
Dept: URBAN - METROPOLITAN AREA CLINIC 71 | Facility: CLINIC | Age: 58
End: 2023-06-29
Payer: COMMERCIAL

## 2023-06-29 DIAGNOSIS — H25.813 COMBINED FORMS OF AGE-RELATED CATARACT, BILATERAL: Primary | ICD-10-CM

## 2023-07-20 ENCOUNTER — SURGERY (OUTPATIENT)
Dept: URBAN - METROPOLITAN AREA SURGERY 28 | Facility: LOCATION | Age: 58
End: 2023-07-20
Payer: COMMERCIAL

## 2023-07-20 DIAGNOSIS — H57.03 MIOSIS: Primary | ICD-10-CM

## 2023-07-20 PROCEDURE — 66982 XCAPSL CTRC RMVL CPLX WO ECP: CPT | Performed by: OPHTHALMOLOGY

## 2023-07-21 ENCOUNTER — POST-OPERATIVE VISIT (OUTPATIENT)
Dept: URBAN - METROPOLITAN AREA CLINIC 71 | Facility: CLINIC | Age: 58
End: 2023-07-21
Payer: COMMERCIAL

## 2023-07-21 DIAGNOSIS — Z48.810 ENCOUNTER FOR SURGICAL AFTERCARE FOLLOWING SURGERY ON A SENSE ORGAN: Primary | ICD-10-CM

## 2023-07-21 PROCEDURE — 99024 POSTOP FOLLOW-UP VISIT: CPT

## 2023-07-21 ASSESSMENT — INTRAOCULAR PRESSURE
OD: 27
OS: 9

## 2023-07-21 NOTE — IMPRESSION/PLAN
Impression: S/P 85695: Cataract Extraction by phacoemulsification with IOL placement; Ivar Pae; OMNI; DURYSTA; MALYUGIN RING OS - 1 Day. Encounter for surgical aftercare following surgery on a sense organ  Z48.810. PO instructions reviewed in detail with patient as well as all questions and concerns answered today. Plan: Patient to continue use of Prednisolone QID OS, Brimonidine QAM OD, Rhopressa QHS OS as well as lubricating drops 5 mins apart. Pt advised that if any pain or tenderness occurs to RTC immediately as this may be a sign of infection. Pt voices understanding.

## 2023-07-26 ENCOUNTER — POST-OPERATIVE VISIT (OUTPATIENT)
Facility: LOCATION | Age: 58
End: 2023-07-26
Payer: COMMERCIAL

## 2023-07-26 DIAGNOSIS — Z48.810 ENCOUNTER FOR SURGICAL AFTERCARE FOLLOWING SURGERY ON A SENSE ORGAN: ICD-10-CM

## 2023-07-26 DIAGNOSIS — H52.4 PRESBYOPIA: Primary | ICD-10-CM

## 2023-07-26 PROCEDURE — 99024 POSTOP FOLLOW-UP VISIT: CPT | Performed by: OPHTHALMOLOGY

## 2023-07-26 PROCEDURE — 92015 DETERMINE REFRACTIVE STATE: CPT | Performed by: OPHTHALMOLOGY

## 2023-07-26 ASSESSMENT — VISUAL ACUITY
OS: 20/30
OD: NLP

## 2023-07-26 ASSESSMENT — INTRAOCULAR PRESSURE
OS: 20
OD: 52

## 2023-08-02 ENCOUNTER — POST-OPERATIVE VISIT (OUTPATIENT)
Dept: URBAN - METROPOLITAN AREA CLINIC 72 | Facility: CLINIC | Age: 58
End: 2023-08-02
Payer: COMMERCIAL

## 2023-08-02 DIAGNOSIS — H52.4 PRESBYOPIA: Primary | ICD-10-CM

## 2023-08-02 DIAGNOSIS — Z48.810 ENCOUNTER FOR SURGICAL AFTERCARE FOLLOWING SURGERY ON A SENSE ORGAN: ICD-10-CM

## 2023-08-02 ASSESSMENT — INTRAOCULAR PRESSURE
OD: 31
OS: 14

## 2023-08-02 ASSESSMENT — VISUAL ACUITY: OS: 20/25

## 2023-08-23 ENCOUNTER — POST-OPERATIVE VISIT (OUTPATIENT)
Dept: URBAN - METROPOLITAN AREA CLINIC 72 | Facility: CLINIC | Age: 58
End: 2023-08-23
Payer: COMMERCIAL

## 2023-08-23 DIAGNOSIS — Z48.810 ENCOUNTER FOR SURGICAL AFTERCARE FOLLOWING SURGERY ON A SENSE ORGAN: Primary | ICD-10-CM

## 2023-08-23 ASSESSMENT — INTRAOCULAR PRESSURE
OD: 24
OS: 15

## 2023-08-30 ENCOUNTER — POST-OPERATIVE VISIT (OUTPATIENT)
Dept: URBAN - METROPOLITAN AREA CLINIC 72 | Facility: CLINIC | Age: 58
End: 2023-08-30
Payer: COMMERCIAL

## 2023-08-30 DIAGNOSIS — Z48.810 ENCOUNTER FOR SURGICAL AFTERCARE FOLLOWING SURGERY ON A SENSE ORGAN: Primary | ICD-10-CM

## 2023-08-30 ASSESSMENT — INTRAOCULAR PRESSURE
OD: 19
OS: 18

## 2023-09-13 ENCOUNTER — POST-OPERATIVE VISIT (OUTPATIENT)
Dept: URBAN - METROPOLITAN AREA CLINIC 72 | Facility: CLINIC | Age: 58
End: 2023-09-13
Payer: COMMERCIAL

## 2023-09-13 DIAGNOSIS — Z48.810 ENCOUNTER FOR SURGICAL AFTERCARE FOLLOWING SURGERY ON A SENSE ORGAN: Primary | ICD-10-CM

## 2023-09-13 ASSESSMENT — INTRAOCULAR PRESSURE
OS: 15
OD: 43

## 2024-01-26 ENCOUNTER — OFFICE VISIT (OUTPATIENT)
Dept: URBAN - METROPOLITAN AREA CLINIC 71 | Facility: CLINIC | Age: 59
End: 2024-01-26
Payer: COMMERCIAL

## 2024-01-26 DIAGNOSIS — H40.1133 PRIMARY OPEN-ANGLE GLAUCOMA, BILATERAL, SEVERE STAGE: Primary | ICD-10-CM

## 2024-01-26 PROCEDURE — 99213 OFFICE O/P EST LOW 20 MIN: CPT

## 2024-01-26 ASSESSMENT — INTRAOCULAR PRESSURE
OS: 17
OD: 25

## 2024-04-24 ENCOUNTER — OFFICE VISIT (OUTPATIENT)
Dept: URBAN - METROPOLITAN AREA CLINIC 72 | Facility: CLINIC | Age: 59
End: 2024-04-24
Payer: COMMERCIAL

## 2024-04-24 DIAGNOSIS — H40.1133 PRIMARY OPEN-ANGLE GLAUCOMA, BILATERAL, SEVERE STAGE: Primary | ICD-10-CM

## 2024-04-24 DIAGNOSIS — H25.811 COMBINED FORMS OF AGE-RELATED CATARACT, RIGHT EYE: ICD-10-CM

## 2024-04-24 PROCEDURE — 92014 COMPRE OPH EXAM EST PT 1/>: CPT

## 2024-04-24 PROCEDURE — 92133 CPTRZD OPH DX IMG PST SGM ON: CPT

## 2024-04-24 ASSESSMENT — INTRAOCULAR PRESSURE
OS: 14
OD: 15

## 2024-07-24 ENCOUNTER — OFFICE VISIT (OUTPATIENT)
Dept: URBAN - METROPOLITAN AREA CLINIC 72 | Facility: CLINIC | Age: 59
End: 2024-07-24
Payer: COMMERCIAL

## 2024-07-24 DIAGNOSIS — H40.1133 PRIMARY OPEN-ANGLE GLAUCOMA, BILATERAL, SEVERE STAGE: Primary | ICD-10-CM

## 2024-07-24 PROCEDURE — 99213 OFFICE O/P EST LOW 20 MIN: CPT

## 2024-07-24 RX ORDER — LATANOPROST 50 UG/ML
0.005 % SOLUTION OPHTHALMIC
Qty: 7 | Refills: 3 | Status: ACTIVE
Start: 2024-07-24

## 2024-07-24 ASSESSMENT — INTRAOCULAR PRESSURE
OS: 18
OS: 16
OD: 28

## 2024-10-23 ENCOUNTER — OFFICE VISIT (OUTPATIENT)
Dept: URBAN - METROPOLITAN AREA CLINIC 72 | Facility: CLINIC | Age: 59
End: 2024-10-23
Payer: COMMERCIAL

## 2024-10-23 DIAGNOSIS — H40.1133 PRIMARY OPEN-ANGLE GLAUCOMA, BILATERAL, SEVERE STAGE: ICD-10-CM

## 2024-10-23 DIAGNOSIS — H40.2213 CHRONIC ANGLE-CLOSURE GLAUCOMA, RIGHT EYE, SEVERE STAGE: ICD-10-CM

## 2024-10-23 DIAGNOSIS — H40.1122 PRIMARY OPEN-ANGLE GLAUCOMA, MODERATE STAGE, LEFT EYE: Primary | ICD-10-CM

## 2024-10-23 PROCEDURE — 99213 OFFICE O/P EST LOW 20 MIN: CPT

## 2024-10-23 ASSESSMENT — INTRAOCULAR PRESSURE
OD: 11
OS: 15
OS: 13

## 2025-02-19 ENCOUNTER — OFFICE VISIT (OUTPATIENT)
Dept: URBAN - METROPOLITAN AREA CLINIC 72 | Facility: CLINIC | Age: 60
End: 2025-02-19
Payer: COMMERCIAL

## 2025-02-19 DIAGNOSIS — H40.1133 PRIMARY OPEN-ANGLE GLAUCOMA, BILATERAL, SEVERE STAGE: Primary | ICD-10-CM

## 2025-02-19 PROCEDURE — 99213 OFFICE O/P EST LOW 20 MIN: CPT

## 2025-02-19 PROCEDURE — 92083 EXTENDED VISUAL FIELD XM: CPT

## 2025-02-19 ASSESSMENT — INTRAOCULAR PRESSURE
OD: 47
OS: 13

## 2025-08-06 ENCOUNTER — OFFICE VISIT (OUTPATIENT)
Dept: URBAN - METROPOLITAN AREA CLINIC 72 | Facility: CLINIC | Age: 60
End: 2025-08-06
Payer: COMMERCIAL

## 2025-08-06 DIAGNOSIS — H04.123 DRY EYE SYNDROME OF BILATERAL LACRIMAL GLANDS: ICD-10-CM

## 2025-08-06 DIAGNOSIS — H40.1133 PRIMARY OPEN-ANGLE GLAUCOMA, BILATERAL, SEVERE STAGE: Primary | ICD-10-CM

## 2025-08-06 PROCEDURE — 99213 OFFICE O/P EST LOW 20 MIN: CPT

## 2025-08-06 ASSESSMENT — INTRAOCULAR PRESSURE
OD: 32
OS: 18